# Patient Record
Sex: MALE | Race: WHITE | NOT HISPANIC OR LATINO | URBAN - METROPOLITAN AREA
[De-identification: names, ages, dates, MRNs, and addresses within clinical notes are randomized per-mention and may not be internally consistent; named-entity substitution may affect disease eponyms.]

---

## 2024-10-01 ENCOUNTER — LAB OUTSIDE AN ENCOUNTER (OUTPATIENT)
Dept: URBAN - METROPOLITAN AREA CLINIC 60 | Facility: CLINIC | Age: 44
End: 2024-10-01

## 2024-10-01 ENCOUNTER — OFFICE VISIT (OUTPATIENT)
Dept: URBAN - METROPOLITAN AREA CLINIC 60 | Facility: CLINIC | Age: 44
End: 2024-10-01
Payer: SELF-PAY

## 2024-10-01 ENCOUNTER — DASHBOARD ENCOUNTERS (OUTPATIENT)
Age: 44
End: 2024-10-01

## 2024-10-01 VITALS
DIASTOLIC BLOOD PRESSURE: 80 MMHG | OXYGEN SATURATION: 96 % | TEMPERATURE: 97.6 F | HEIGHT: 70 IN | HEART RATE: 77 BPM | BODY MASS INDEX: 30.72 KG/M2 | SYSTOLIC BLOOD PRESSURE: 124 MMHG | WEIGHT: 214.6 LBS | RESPIRATION RATE: 20 BRPM

## 2024-10-01 DIAGNOSIS — K62.5 BRBPR (BRIGHT RED BLOOD PER RECTUM): ICD-10-CM

## 2024-10-01 DIAGNOSIS — K64.9 BLEEDING HEMORRHOIDS: ICD-10-CM

## 2024-10-01 DIAGNOSIS — R19.5 LOOSE STOOLS: ICD-10-CM

## 2024-10-01 DIAGNOSIS — B19.20 HEPATITIS C TEST POSITIVE: ICD-10-CM

## 2024-10-01 PROBLEM — 365865003: Status: ACTIVE | Noted: 2024-10-01

## 2024-10-01 PROCEDURE — 99204 OFFICE O/P NEW MOD 45 MIN: CPT

## 2024-10-01 RX ORDER — HYDROCORTISONE ACETATE 25 MG/1
1 SUPPOSITORY SUPPOSITORY RECTAL ONCE A DAY
Qty: 7 | Refills: 0 | OUTPATIENT
Start: 2024-10-01 | End: 2024-10-08

## 2024-10-01 RX ORDER — HYDROCORTISONE 25 MG/G
1 APPLICATION CREAM TOPICAL TWICE A DAY
Qty: 15 GRAMS | Refills: 0 | OUTPATIENT
Start: 2024-10-01 | End: 2024-10-15

## 2024-10-01 NOTE — HPI-TODAY'S VISIT:
Patient is a 44-year-old male referred by PCP for blood in his stool.  Past medical history of Brugada syndrome, drug abuse, nicotine dependence, chronic hepatitis C diagnosed 15 years ago. Treatment naive. History of IVDU.  Patient comes in complaining of 3 months of brbpr on TP and in toilet bowl happening a few times per week. No abdominal pain, fevers, chills, melena, n/v, reflux, dysphagia or unintentional weight loss. He is additionally complaining of loose stools more often than not over the past few months. Possibly related to his diet as he states he doesn't eat much fiber or a traditionally "healthy" diet. No prior colonoscopy. No family history of CRC. Patient recommended colonoscopy due to new sx as well as age but defers as he is self pay and unable to afford it at this point. Rectal exam revealed small external hemorrhoid, perianal skin tag and internal hemorrhoids.   Labs in [place] in 2018 showed positive Hep C abx, no PCR seen. Negative Hep B profile. Negative HIV.

## 2024-10-21 ENCOUNTER — LAB OUTSIDE AN ENCOUNTER (OUTPATIENT)
Dept: URBAN - METROPOLITAN AREA CLINIC 63 | Facility: CLINIC | Age: 44
End: 2024-10-21

## 2024-10-21 ENCOUNTER — TELEPHONE ENCOUNTER (OUTPATIENT)
Dept: URBAN - METROPOLITAN AREA CLINIC 63 | Facility: CLINIC | Age: 44
End: 2024-10-21

## 2024-10-21 PROBLEM — 197321007: Status: ACTIVE | Noted: 2024-10-21

## 2024-12-02 ENCOUNTER — TELEPHONE ENCOUNTER (OUTPATIENT)
Dept: URBAN - METROPOLITAN AREA CLINIC 63 | Facility: CLINIC | Age: 44
End: 2024-12-02